# Patient Record
Sex: FEMALE | Race: WHITE | NOT HISPANIC OR LATINO | URBAN - METROPOLITAN AREA
[De-identification: names, ages, dates, MRNs, and addresses within clinical notes are randomized per-mention and may not be internally consistent; named-entity substitution may affect disease eponyms.]

---

## 2017-08-19 ASSESSMENT — MIFFLIN-ST. JEOR: SCORE: 1268.63

## 2017-08-20 ENCOUNTER — ANESTHESIA - HEALTHEAST (OUTPATIENT)
Dept: SURGERY | Facility: CLINIC | Age: 82
End: 2017-08-20

## 2017-08-20 ENCOUNTER — SURGERY - HEALTHEAST (OUTPATIENT)
Dept: SURGERY | Facility: CLINIC | Age: 82
End: 2017-08-20

## 2017-08-20 ASSESSMENT — MIFFLIN-ST. JEOR: SCORE: 1292.22

## 2017-08-21 ASSESSMENT — MIFFLIN-ST. JEOR: SCORE: 1318.07

## 2017-08-22 ASSESSMENT — MIFFLIN-ST. JEOR: SCORE: 1370.24

## 2017-08-23 ASSESSMENT — MIFFLIN-ST. JEOR: SCORE: 1404.26

## 2017-08-24 ASSESSMENT — MIFFLIN-ST. JEOR: SCORE: 1396.09

## 2017-08-29 ENCOUNTER — OFFICE VISIT - HEALTHEAST (OUTPATIENT)
Dept: GERIATRICS | Facility: CLINIC | Age: 82
End: 2017-08-29

## 2017-08-29 DIAGNOSIS — N39.0 URINARY TRACT INFECTION: ICD-10-CM

## 2017-08-29 DIAGNOSIS — K25.5: ICD-10-CM

## 2017-08-29 DIAGNOSIS — R55 SYNCOPE: ICD-10-CM

## 2017-08-29 DIAGNOSIS — R52 PAIN MANAGEMENT: ICD-10-CM

## 2017-08-29 DIAGNOSIS — A41.50 GRAM NEGATIVE SEPSIS (H): ICD-10-CM

## 2017-08-31 ENCOUNTER — OFFICE VISIT - HEALTHEAST (OUTPATIENT)
Dept: GERIATRICS | Facility: CLINIC | Age: 82
End: 2017-08-31

## 2017-08-31 DIAGNOSIS — N39.0 URINARY TRACT INFECTION: ICD-10-CM

## 2017-08-31 DIAGNOSIS — A41.50 GRAM NEGATIVE SEPSIS (H): ICD-10-CM

## 2017-08-31 DIAGNOSIS — R52 PAIN MANAGEMENT: ICD-10-CM

## 2017-08-31 DIAGNOSIS — K25.5: ICD-10-CM

## 2017-09-04 ENCOUNTER — OFFICE VISIT - HEALTHEAST (OUTPATIENT)
Dept: GERIATRICS | Facility: CLINIC | Age: 82
End: 2017-09-04

## 2017-09-04 DIAGNOSIS — Z79.01 LONG TERM CURRENT USE OF ANTICOAGULANT THERAPY: ICD-10-CM

## 2017-09-04 DIAGNOSIS — I10 ESSENTIAL HYPERTENSION: ICD-10-CM

## 2017-09-04 DIAGNOSIS — D64.9 ANEMIA: ICD-10-CM

## 2017-09-04 DIAGNOSIS — R19.8 PERFORATED VISCUS: ICD-10-CM

## 2017-09-04 DIAGNOSIS — R55 VASOVAGAL SYNCOPE: ICD-10-CM

## 2017-09-06 ENCOUNTER — OFFICE VISIT - HEALTHEAST (OUTPATIENT)
Dept: GERIATRICS | Facility: CLINIC | Age: 82
End: 2017-09-06

## 2017-09-06 DIAGNOSIS — R63.0 DECREASED APPETITE: ICD-10-CM

## 2017-09-06 DIAGNOSIS — Z98.890 H/O EXPLORATORY LAPAROTOMY: ICD-10-CM

## 2017-09-06 DIAGNOSIS — I10 HYPERTENSION: ICD-10-CM

## 2017-09-06 DIAGNOSIS — R53.1 GENERAL WEAKNESS: ICD-10-CM

## 2017-09-10 ENCOUNTER — OFFICE VISIT - HEALTHEAST (OUTPATIENT)
Dept: GERIATRICS | Facility: CLINIC | Age: 82
End: 2017-09-10

## 2017-09-10 DIAGNOSIS — T81.49XA SURGICAL WOUND INFECTION: ICD-10-CM

## 2017-09-11 ENCOUNTER — OFFICE VISIT - HEALTHEAST (OUTPATIENT)
Dept: GERIATRICS | Facility: CLINIC | Age: 82
End: 2017-09-11

## 2017-09-11 DIAGNOSIS — I10 ESSENTIAL HYPERTENSION: ICD-10-CM

## 2017-09-11 DIAGNOSIS — R63.0 DECREASED APPETITE: ICD-10-CM

## 2017-09-11 DIAGNOSIS — R19.8 PERFORATED VISCUS: ICD-10-CM

## 2017-09-11 DIAGNOSIS — Z98.890 H/O EXPLORATORY LAPAROTOMY: ICD-10-CM

## 2017-09-11 DIAGNOSIS — R53.1 GENERAL WEAKNESS: ICD-10-CM

## 2017-09-11 DIAGNOSIS — A41.50 GRAM NEGATIVE SEPSIS (H): ICD-10-CM

## 2017-09-14 ENCOUNTER — OFFICE VISIT - HEALTHEAST (OUTPATIENT)
Dept: GERIATRICS | Facility: CLINIC | Age: 82
End: 2017-09-14

## 2017-09-14 DIAGNOSIS — I10 ESSENTIAL HYPERTENSION: ICD-10-CM

## 2017-09-14 DIAGNOSIS — Z98.890 H/O EXPLORATORY LAPAROTOMY: ICD-10-CM

## 2017-09-14 DIAGNOSIS — R19.8 PERFORATED VISCUS: ICD-10-CM

## 2017-09-14 DIAGNOSIS — A41.50 GRAM NEGATIVE SEPSIS (H): ICD-10-CM

## 2017-09-15 ENCOUNTER — AMBULATORY - HEALTHEAST (OUTPATIENT)
Dept: GERIATRICS | Facility: CLINIC | Age: 82
End: 2017-09-15

## 2020-01-20 ENCOUNTER — RECORDS - HEALTHEAST (OUTPATIENT)
Dept: ADMINISTRATIVE | Facility: OTHER | Age: 85
End: 2020-01-20

## 2020-01-31 ENCOUNTER — RECORDS - HEALTHEAST (OUTPATIENT)
Dept: LAB | Facility: CLINIC | Age: 85
End: 2020-01-31

## 2020-01-31 LAB
ALBUMIN UR-MCNC: ABNORMAL MG/DL
AMORPH CRY #/AREA URNS HPF: ABNORMAL /[HPF]
APPEARANCE UR: ABNORMAL
BACTERIA #/AREA URNS HPF: ABNORMAL HPF
BILIRUB UR QL STRIP: NEGATIVE
CAOX CRY #/AREA URNS HPF: PRESENT /[HPF]
COLOR UR AUTO: ABNORMAL
GLUCOSE UR STRIP-MCNC: NEGATIVE MG/DL
HGB UR QL STRIP: ABNORMAL
KETONES UR STRIP-MCNC: NEGATIVE MG/DL
LEUKOCYTE ESTERASE UR QL STRIP: ABNORMAL
NITRATE UR QL: NEGATIVE
PH UR STRIP: 8 [PH] (ref 4.5–8)
RBC #/AREA URNS AUTO: ABNORMAL HPF
SP GR UR STRIP: 1.02 (ref 1–1.03)
SQUAMOUS #/AREA URNS AUTO: ABNORMAL LPF
TRI-PHOS CRY #/AREA URNS HPF: PRESENT /[HPF]
UROBILINOGEN UR STRIP-ACNC: ABNORMAL
WBC #/AREA URNS AUTO: ABNORMAL HPF
WBC CLUMPS #/AREA URNS HPF: PRESENT /[HPF]

## 2020-02-01 LAB — BACTERIA SPEC CULT: NORMAL

## 2020-03-01 ENCOUNTER — RECORDS - HEALTHEAST (OUTPATIENT)
Dept: LAB | Facility: CLINIC | Age: 85
End: 2020-03-01

## 2020-03-01 LAB
ALBUMIN UR-MCNC: ABNORMAL MG/DL
APPEARANCE UR: ABNORMAL
BACTERIA #/AREA URNS HPF: ABNORMAL HPF
BILIRUB UR QL STRIP: NEGATIVE
COLOR UR AUTO: YELLOW
GLUCOSE UR STRIP-MCNC: NEGATIVE MG/DL
HGB UR QL STRIP: NEGATIVE
KETONES UR STRIP-MCNC: NEGATIVE MG/DL
LEUKOCYTE ESTERASE UR QL STRIP: ABNORMAL
MUCOUS THREADS #/AREA URNS LPF: ABNORMAL LPF
NITRATE UR QL: NEGATIVE
PH UR STRIP: 7.5 [PH] (ref 4.5–8)
RBC #/AREA URNS AUTO: ABNORMAL HPF
SP GR UR STRIP: 1.01 (ref 1–1.03)
SQUAMOUS #/AREA URNS AUTO: ABNORMAL LPF
UROBILINOGEN UR STRIP-ACNC: ABNORMAL
WBC #/AREA URNS AUTO: >100 HPF
WBC CLUMPS #/AREA URNS HPF: PRESENT /[HPF]

## 2020-03-02 LAB — BACTERIA SPEC CULT: NORMAL

## 2021-05-31 ENCOUNTER — RECORDS - HEALTHEAST (OUTPATIENT)
Dept: ADMINISTRATIVE | Facility: CLINIC | Age: 86
End: 2021-05-31

## 2021-05-31 VITALS — WEIGHT: 184.7 LBS | BODY MASS INDEX: 28.93 KG/M2

## 2021-05-31 VITALS — HEIGHT: 67 IN | WEIGHT: 210.2 LBS | BODY MASS INDEX: 32.99 KG/M2

## 2021-06-02 ENCOUNTER — RECORDS - HEALTHEAST (OUTPATIENT)
Dept: ADMINISTRATIVE | Facility: CLINIC | Age: 86
End: 2021-06-02

## 2021-06-12 NOTE — PROGRESS NOTES
[facility]    Facility:   [facility] [[id number]]   Code Status: UNKNOWN      CHIEF COMPLAINT/REASON FOR VISIT:  Chief Complaint   Patient presents with     Review Of Multiple Medical Conditions     Perforated gastric ulcer, syncope with altered mental status, gram-negative sepsis currently on antibiotics systemic inflammatory response syndrome, acute renal failure superimposed on stage III chronic kidney disease, essential hypertension, persistent atrial fibrillation, possible wound infection.       HISTORY:      HPI: Maria Dolores is a 88 y.o. female who resides here at the [facility] undergoing physical and occupational therapy as well as medical management of multiple medical problems.  She was admitted to the hospital and she did have a perforated gastric ulcer and underwent exploratory laparotomy.  She did have severe abdominal pain and she had free air in the lower mediastinum around a large hiatal hernia.  She was treated with IV Levaquin and IV Zosyn as well as a proton pump inhibitor and she remains on antibiotics at this time.  Repeat blood cultures on 8/22/2017 were negative for greater than 72 hours she did progress as anticipated postoperatively.  He was recommended she have 3 weeks of IV antibiotics and she will follow-up with urology secondary to recurrent urinary tract infections.  She will likely need follow-up with EGD.    There is mention of the notes of possible surgical wound infection.  However the incision does not appear to be infected at this time.  Patient complains of nothing at this time however she does have a weight loss of about 10 pounds since she has been admitted here.  She does have a poor appetite and she does have some nausea when she eats too much.  She is drinking well at this time and has no other concerns.  She is quite happy about the weight loss and feels she wants to lose weight however this is not the environment  in which she can lose weight.  She does have persistent atrial fibrillation however she is in sinus rhythm by auscultation today.  She does have left leg pain and swelling and she has not had a bowel movement in 2 days.    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 No pertinent family history.  Social History     Social History     Marital status:      Spouse name: N/A     Number of children: N/A     Years of education: N/A     Social History Main Topics     Smoking status: Never Smoker     Smokeless tobacco: Never Used     Alcohol use No     Drug use: No     Sexual activity: Not Asked     Other Topics Concern     None     Social History Narrative         Review of Systems   Constitutional:        Patient has not had a bowel movement within 2 days she also has left leg pain with some swelling of the left leg and she is urinating without difficulty.  She denies any urgency frequency or burning.  She has no pain at this time and denies any fevers chills vomiting diarrhea change in vision hearing taste or smell weakness one side or the other chest pain or shortness of breath.  The remainder the review of systems is negative.       .  Vitals:    09/11/17 0820   BP: 158/77   Pulse: 78   Resp: 16   Temp: 98.3  F (36.8  C)   SpO2: 98%       Physical Exam   Constitutional: No distress.   HENT:   Head: Normocephalic and atraumatic.   Mouth/Throat: No oropharyngeal exudate.   Eyes: Right eye exhibits no discharge. Left eye exhibits no discharge. No scleral icterus.   Neck: Neck supple. No thyromegaly present.   Cardiovascular: Normal rate and regular rhythm.    Pulmonary/Chest: Effort normal and breath sounds normal. No respiratory distress. She has no wheezes. She has no rales.   Abdominal: Soft. Bowel sounds are normal.  She exhibits distension.   Incision has Steri-Strips in the top part of the incision without any signs of drainage or discharge.  Incision does not look infected at this time and the repeat the lower staples are intact.   Musculoskeletal: She exhibits edema.   Left leg edema with palpatory tenderness over the posterior aspect of the upper leg.   Lymphadenopathy:     She has no cervical adenopathy.   Neurological: She is alert. No cranial nerve deficit.   Skin: She is not diaphoretic.   Psychiatric: She has a normal mood and affect. Her behavior is normal.         LABS: Recent laboratory values are as follows on 8/31/2017 a basic metabolic profile came back with sodium of 140 and a potassium of 3.2.  There is no recheck on the potassium at this time and the creatinine was 1.08 and the GFR was 48.  On 9/7/2017 hemoglobin was 10.8, and potassium went to 3.7.  C. difficile was negative on 9/5/2017.  Patient's weights are down to 175 yesterday which is down from 184 which is a loss of 9 pounds.      ASSESSMENT:      ICD-10-CM    1. H/O exploratory laparotomy Z98.890    2. Perforated viscus R19.8    3. General weakness R53.1    4. Decreased appetite R63.0    5. Gram negative sepsis A41.50    6. Essential hypertension I10        PLAN: Plan at this time is address the weight loss at this time house supplement will be done 3 times daily and nutrition will follow.  Await will be done stat today and basic metabolic profile be done today secondary to weight loss and possible acute kidney injury.  Her weakness seems to be improving at this time and her incision seems to be healing well at this time with no signs of any infection redness or discharge.  We will do a venous Doppler ultrasound today of the ultrasound of the right lower extremity to rule out DVT.  This is secondary to palpatory findings of pain in the posterior aspect of her upper left leg with some swelling.  I will continue to monitor above medical problems and  patient will continue with physical and Occupational Therapy.  Greater than 38 minutes was spent on this follow-up visit with greater than 50% of the time dedicated to coordination of care.      Total  minutes of which % was spent counseling and coordination of care of the above plan.    Electronically signed by: Andrea Rush DO

## 2021-06-12 NOTE — ANESTHESIA PREPROCEDURE EVALUATION
Anesthesia Evaluation      Patient summary reviewed   No history of anesthetic complications     Airway   Mallampati: II  Neck ROM: limited   Pulmonary - normal exam                          Cardiovascular   Exercise tolerance: < 4 METS  (+) hypertension, dysrhythmias (A-fib with RVR), ,     Rhythm: regular  Rate: normal,         Neuro/Psych      Endo/Other       GI/Hepatic/Renal    (+) hiatal hernia, GERD,   chronic renal disease,      Other findings: Patient wishes to be FULL CODE          Dental    (+) upper dentures and edentulous                       Anesthesia Plan  Planned anesthetic: general endotracheal  RSI  Clarified with patient that she is to be FULL CODE  ASA 3   Induction: intravenous   Anesthetic plan and risks discussed with: patient  Anesthesia plan special considerations: rapid sequence induction, antiemetics, IV therapy two IVs,   Post-op plan: routine recovery  DNR/DNI status   DNR/DNI status discussed with patient.  Suspension of DNR: Patient wishes to be FULL CODE at all times.

## 2021-06-12 NOTE — ANESTHESIA POSTPROCEDURE EVALUATION
Patient: Maria Dolores YOUNG [place]  EXPLORATORY LAPAROTOMY WITH REPAIR/PATCH PERFORATED GASTRIC ULCER  Anesthesia type: general    Patient location: PACU  Last vitals:   Vitals:    08/20/17 1530   BP: 115/63   Pulse: 90   Resp: 24   Temp:    SpO2: 95%     Post vital signs: stable  Level of consciousness: awake and responds to simple questions  Post-anesthesia pain: pain controlled  Post-anesthesia nausea and vomiting: no  Pulmonary: unassisted, return to baseline  Cardiovascular: stable and blood pressure at baseline  Hydration: adequate  Anesthetic events: no    QCDR Measures:  ASA# 11 - Suellen-op Cardiac Arrest: ASA11B - Patient did NOT experience unanticipated cardiac arrest  ASA# 12 - Suellen-op Mortality Rate: ASA12B - Patient did NOT die  ASA# 13 - PACU Re-Intubation Rate: ASA13B - Patient did NOT require a new airway mgmt  ASA# 10 - Composite Anes Safety: ASA10A - No serious adverse event  ASA# 38 - New Corneal Injury: ASA38A - No new exposure keratitis or corneal abrasion in PACU    Additional Notes:

## 2021-06-12 NOTE — PROGRESS NOTES
[facility]    Facility:   [facility] [[id number]]   Code Status: POLST AVAILABLE      CHIEF COMPLAINT/REASON FOR VISIT:  Chief Complaint   Patient presents with     Review Of Multiple Medical Conditions     Gram (-) sepsis, UTI, perforated gastric ulcer       HISTORY:   HPI: Maria Dolores is a 88 y.o. female with PMH gram (-) sepsis, UTI, perforated gastric ulcer who is being seen today at [facility]CU for review of multiple medical conditions. Maria Dolores was admitted to the hospital admitted to [facility] after not feeling well for a few days. She had poor oral intake and urinary incontinence and also urinary retention with abdominal pain.  She then had an  unresponsive episode and found to have gram-negative bacteria which is Klebsiella pneumonia which was pansensitive.  She was treated aggressively with antibiotics and a CT scan was done secondary to her abdominal pain and showed free air in the lower mediastinum around a large hiatal hernia and also free air in the abdomen.  She was evaluated by surgery and underwent exploratory laparotomy with repair and patch of the perforated gastric ulcer. She was treated with IV Levaquin and IV Zosyn and was put on a proton pump inhibitor.  Repeat blood cultures on 8/22/2017 were negative.  At discharge It was recommended that she have 3 weeks of IV antibiotics and she will need follow-up with urology secondary to her recurrent urinary tract infections.  She will also see GI as an outpatient for possible EGD due to her gastric ulcer.    Maria Dolores' stay at TCU has been progressing well. She is participating in therapies without difficulties. Pain has been managed. Denies abdomnal pain at this time.  Continues on IV abx for sepsis. On last lab check noted to have K 2.8. Was started on KCL 20 meq daily and K today was 3.2. Has complaints of runny nose. Thinks it may be related to allergy. Is requesting medication for.  Also is concerned about edema to her BLEs. Legs are usually in a dependant position. Has no other concerns. Denies respiratory difficulties. Denies lightheadedness, dizziness, chest pain.     Past Medical History:   Diagnosis Date     Avascular necrosis of femoral head 1/14/2015     CKD (chronic kidney disease) stage 3, GFR 30-59 ml/min      Essential hypertension      Persistent atrial fibrillation              No family history on file.  Social History     Social History     Marital status:      Spouse name: N/A     Number of children: N/A     Years of education: N/A     Social History Main Topics     Smoking status: Never Smoker     Smokeless tobacco: Never Used     Alcohol use No     Drug use: No     Sexual activity: Not on file     Other Topics Concern     Not on file     Social History Narrative     Current Outpatient Prescriptions   Medication Sig Dispense Refill     fluticasone (FLONASE) 50 mcg/actuation nasal spray 1 spray into each nostril daily.       potassium chloride (KLOR-CON) 20 mEq packet Take 20 mEq by mouth daily.       acetaminophen (TYLENOL) 500 MG tablet Take 1 tablet (500 mg total) by mouth every 4 (four) hours as needed for pain (For mild to mod pain).  0     aspirin 81 MG EC tablet Take 81 mg by mouth daily.       bimatoprost (LUMIGAN) 0.01 % Drop Administer 1 drop to both eyes at bedtime.        dorzolamide (TRUSOPT) 2 % ophthalmic solution Administer 1 drop to both eyes 3 (three) times a day.       lisinopril (PRINIVIL,ZESTRIL) 10 MG tablet Take 10 mg by mouth daily.       metoprolol succinate (TOPROL-XL) 25 MG Take 12.5 mg by mouth 2 (two) times a day.       miscellaneous medical supply (BLOOD PRESSURE CUFF) Misc Use 1 kit As Directed every morning. 1 each 0     omeprazole (PRILOSEC) 20 MG capsule Take 1 capsule (20 mg total) by mouth daily before breakfast. 60 capsule 0     oxyCODONE (ROXICODONE) 5 MG immediate release tablet Take 0.5 tablets (2.5 mg total) by mouth every 4 (four)  hours as needed for pain (or mod to severe pain). 15 tablet 0     piperacillin-tazobactam 3.375 g in NaCl 0.9 % 0.9 % 100 mL IVPB Infuse 3.375 g into a venous catheter every 8 (eight) hours for 20 days. 1 each 0     No current facility-administered medications for this visit.      Allergies   Allergen Reactions     Ibuprofen Unknown       REVIEW OF SYSTEM:  Constitutional: negative for chills, fatigue and fevers  Respiratory: negative for cough, dyspnea on exertion and sputum  Cardiovascular: negative for chest pain, fatigue and syncope  Gastrointestinal: negative  Genitourinary:negative for dysuria  Integument/breast: Healing abdominal incision covered with dressing  Musculoskeletal:Positive for edema  Behavioral/Psych: negative  Neurologic: Intermittant burning pain to left arm (previous hx of shingles)    PHYSICAL EXAM:     General Appearance:    Alert, cooperative, no distress, appears stated age   Head:    Normocephalic, without obvious abnormality, atraumatic   Eyes:    Conjunctiva/corneas clear both eyes   Ears:    Normal TM's and external ear canals, both ears   Nose:   Nares normal, septum midline   Throat:   Lips, mucosa, and tongue normal; teeth and gums normal   Neck:   Supple, symmetrical, trachea midline, no adenopathy   Lungs:     Clear to auscultation bilaterally, respirations unlabored    Heart:    Irregular rhythm with controlled rate   Abdomen:     Soft, non-tender, bowel sounds active all four quadrants,     no masses, no organomegaly;   Extremities:   Extremities normal, atraumatic, Edema to BLE (L>R)   Pulses:   2+ and symmetric all extremities   Skin:  Incision to abdomen covered with dressing, no signs of infection noteds   Neurologic:   Normal strength, sensation and reflexes     throughout           LABS:   Component      Latest Ref Rng & Units 8/30/2017 8/31/2017   Sodium      136 - 145 mmol/L 139 140   Potassium      3.5 - 5.0 mmol/L 2.7 (LL) 3.2 (L)   Chloride      98 - 107 mmol/L 107  109 (H)   CO2      22 - 31 mmol/L 24 24   Anion Gap, Calculation      5 - 18 mmol/L 8 7   Glucose      70 - 125 mg/dL 148 (H) 118   Calcium      8.5 - 10.5 mg/dL 8.4 (L) 8.8   BUN      8 - 28 mg/dL 11 8   Creatinine      0.60 - 1.10 mg/dL 1.17 (H) 1.08   GFR MDRD Af Amer      >60 mL/min/1.73m2 53 (L) 58 (L)   GFR MDRD Non Af Amer      >60 mL/min/1.73m2 44 (L) 48 (L)   Hemoglobin      12.0 - 16.0 g/dL 10.6 (L)        ASSESSMENT:    ICD-10-CM    1. Gram negative sepsis A41.50    2. Urinary tract infection N39.0    3. Perforated gastric ulcer K25.5    4. Pain management R52        PLAN:    Due to low K levels will continue KCL 20 meq daily. Continue IV abx for sepsis. Follow-up with urology as outpatient for frequent UTIs and with GI for EGD for perforated ulcer . For complaints of allergic rhinitis will order Flonase 1 spray each nostril daily.  For BLE edema encourage elevation of bilateral lower extremities when up. Will also order ACE wraps - on in AM and off at HS. Incision healing. No other changes to medications or treatments at this time as is otherwise stable.    IFederica am scribing for and in the presence of Andrea Rush MD.   I Andrea Rush  personally performed the services described in this documentation, as scribed by  Federica Boyle in my presence, and it is both accurate and complete.     Electronically signed by: Andrea Rush DO

## 2021-06-12 NOTE — PROGRESS NOTES
[facility]    Facility:   [facility] [[id number]]   Code Status: UNKNOWN      CHIEF COMPLAINT/REASON FOR VISIT:  Chief Complaint   Patient presents with     Discharge Summary     Perforated gastric ulcer, syncope with altered mental status, gram-negative sepsis was currently on antibiotics, systemic inflammatory response syndrome which has resolved, acute renal failure superimposed on stage III chronic kidney disease, essential hypertension, persistent atrial fibrillation.       HISTORY:      HPI: Maria Dolores is a 88 y.o. female was been residing here at the [facility] undergoing physical and occupational therapy secondary to multiple medical problems.  She was recently admitted to the hospital with severe abdominal pain she did have free air in the lower mediastinal area around the large hiatal hernia.  She was also found to have gram-negative sepsis was started on IV Levaquin and IV Zosyn.  She was also put on a protein pump inhibitor and repeat blood cultures on 8/22/2017 in the hospital were negative for greater than 72 hours.  She was kept on IV Zosyn and she did have exploratory laparotomy in the hospital.  She was on 3 weeks of IV antibiotics and she did finish yesterday.  She does have recurrent urinary tract infections and she is expected to follow-up with urology.  And she will likely need follow-up with EGD.    During her hospital stay she progressed as anticipated with physical and Occupational Therapy.  She has slight worsening of her GFR as well as her creatinine however the rest of her basic metabolic profile stayed within normal limits.  She did have a possible wound infection but culture did reveal normal skin henrique and incision looked intact.  Staples were removed here in the TCU and Steri-Strips were placed.  She did have some nausea when she eats too much however she is drinking well at this time.  She does like the weight loss however  we did encourage her that this is not the time to have a weight loss and there was a weight loss here in the TCU.  She does have persistent atrial fibrillation however she has been in sinus rhythm for most of her time here.  She also had left leg and swelling in the right leg but that was negative for DVT and she is progressed as anticipated with physical and Occupational Therapy.    Patient claims she feels well today the antibiotics were stopped yesterday and she had her PICC line removed without any difficulty.  Her abdominal incision is Steri-Stripped at this time and no signs of any infection and she still has poor appetite but she is forcing herself to eat.  She has had a weight loss here and I did reiterate that she needs to eat and drink given that she does have acute kidney injury on chronic kidney disease.  Her vitals have remained stable.    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 No pertinent family history.  Social History     Social History     Marital status:      Spouse name: N/A     Number of children: N/A     Years of education: N/A     Social History Main Topics     Smoking status: Never Smoker     Smokeless tobacco: Never Used     Alcohol use No     Drug use: No     Sexual activity: Not Asked     Other Topics Concern     None     Social History Narrative         Review of Systems   Constitutional:        Patient denies any pain fevers chills nausea vomiting diarrhea change in vision hearing taste or smell weakness one side or the other chest pain shortness of breath she denies any incontinence urine or stool polyphagia or polydipsia polyuria urgency frequency or burning with urination and the remainder the review of systems is negative.    Patient also does have a weight loss and needs to be  monitored and she needs to follow-up with urology for urinalysis as well as follow-up with her primary care physician for weight and basic metabolic profile.       .  Vitals:    09/14/17 0747   BP: 138/76   Pulse: 74   Resp: 20   Temp: 97.3  F (36.3  C)   SpO2: 96%       Physical Exam   Constitutional: She is oriented to person, place, and time. No distress.   HENT:   Head: Normocephalic and atraumatic.   Nose: Nose normal.   Mouth/Throat: No oropharyngeal exudate.   Eyes: Right eye exhibits no discharge. Left eye exhibits no discharge. No scleral icterus.   Neck: No thyromegaly present.   Cardiovascular: Normal rate and regular rhythm.    Pulmonary/Chest: Effort normal and breath sounds normal. No respiratory distress. She has no wheezes. She has no rales.   Abdominal: Soft. Bowel sounds are normal. She exhibits distension. There is no tenderness.   Musculoskeletal:   Patient has trace edema bilateral.   Lymphadenopathy:     She has no cervical adenopathy.   Neurological: She is alert and oriented to person, place, and time. No cranial nerve deficit. She exhibits normal muscle tone.   Skin: Skin is warm and dry. She is not diaphoretic.   Psychiatric: She has a normal mood and affect. Her behavior is normal.         LABS: Laboratory values done here in the transitional care include the following on 9/7/2017 hemoglobin was stable at 10.8.  On 9/11/2017 sodium was 141, potassium was 4.1, calcium was 9.7, creatinine was 1.34, GFR was 37.      ASSESSMENT:      ICD-10-CM    1. H/O exploratory laparotomy Z98.890    2. Perforated viscus R19.8    3. Essential hypertension I10    4. Gram negative sepsis A41.50        PLAN: Plan at this time is okay to discharge home with current medications medication reconciliation was done at this time and appears to be accurate at this time with what is on her current med regime at this time.  Antibiotics were stopped yesterday in the IV was pulled.  She will need to follow-up with her  primary care physician within 1 week for a basic metabolic profile as well as follow-up with urology for urinalysis.  Staples have been removed and Steri-Strips will fall off eventually.  No other changes to care plan at this time and greater than 30 minutes was spent on this discharge with greater than 50% of the time dedicated to coordination of care including discussing case with staff and informing patient of her follow-up plans.  Also med reconciliation was done.      Total  minutes of which % was spent counseling and coordination of care of the above plan.    Electronically signed by: Andrea Rush DO

## 2021-06-12 NOTE — PROGRESS NOTES
[facility]    This encounter occurred on September 8    Facility:   [facility] [[id number]]   Code Status: FULL CODE    88-year-old woman underwent repair of a perforated gastric ulcer.Hospitalized 29 August.  She has had an uneventful recovery.  Asked by the nurses to evaluate her wound.  Patient is comfortable with no complaints.  Vital signs been stable.  Appetite is improving.  She demonstrates the proximal 11 staples showing bright erythema and slight induration around the insertion sites.  The distal staples are clean and dry.    Assessment- emerging cellulitis around the proximal staples of the surgical wound.  Plan- remove top 12 staples and fixed with Steri-Strips.    Electronically signed by: Barney Nettles MD

## 2021-06-12 NOTE — ANESTHESIA CARE TRANSFER NOTE
Last vitals:   Vitals:    08/20/17 1441   BP: 119/58   Pulse: 86   Resp: 14   Temp: 36.3  C (97.3  F)   SpO2: 98%     Patient's level of consciousness is drowsy  Spontaneous respirations: yes  Maintains airway independently: yes  Dentition unchanged: yes  Oropharynx: oropharynx clear of all foreign objects    QCDR Measures:  ASA# 20 - Surgical Safety Checklist: WHO surgical safety checklist completed prior to induction  PQRS# 430 - Adult PONV Prevention: 4558F - Pt received => 2 anti-emetic agents (different classes) preop & intraop  ASA# 8 - Peds PONV Prevention: NA - Not pediatric patient, not GA or 2 or more risk factors NOT present  PQRS# 424 - Suellen-op Temp Management: 4559F - At least one body temp DOCUMENTED => 35.5C or 95.9F within required timeframe  PQRS# 426 - PACU Transfer Protocol: - Transfer of care checklist used  ASA# 14 - Acute Post-op Pain: ASA14B - Patient did NOT experience pain >= 7 out of 10

## 2021-06-12 NOTE — PROGRESS NOTES
[facility]    Facility:   [facility] [[id number]]   Code Status: FULL CODE      CHIEF COMPLAINT/REASON FOR VISIT:  Chief Complaint   Patient presents with     Review Of Multiple Medical Conditions     perforated gastric ulcer, syncope, stage 3 chronic kidney disease, HTN       HISTORY:      HPI: Maria Dolores is a 88 y.o. female  who was recently admitted to the hospital on 8/19/2017.  She was admitted to [facility] after not feeling well for a few days she had poor oral intake and urinary incontinence and also urinary retention with abdominal pain. She then was unresponsive and was brought into the hospital was found to have gram-negative bacteria which is Klebsiella pneumonia which was pansensitive.  She was treated aggressively with antibiotics and a CT scan was done secondary to her abdominal pain and showed free air in the lower mediastinum around a large hiatal hernia and also free air in the abdomen.  She was evaluated by surgery and underwent exploratory laparotomy with repair and patch of the perforated gastric ulcer.  She was treated with IV Levaquin and IV Zosyn and was put on a proton pump inhibitor.    Repeat blood cultures on 8/22/2017 were negative greater than 72 hours and she did progress postoperatively.  It was recommended that she have 3 weeks of IV antibiotics and she will need follow-up with urology secondary to her recurrent urinary tract infections.  She will see GI as an outpatient for possible EGD due to her gastric  ulcer.  She was treated appropriately and transferred here to the TCU at [place] in stable condition where she is undergoing physical and occupational therapy.        Past Medical History:   Diagnosis Date     Avascular necrosis of femoral head 1/14/2015     CKD (chronic kidney disease) stage 3, GFR 30-59 ml/min      Essential hypertension      Persistent atrial fibrillation              No family history on file.  Social  History     Social History     Marital status:      Spouse name: N/A     Number of children: N/A     Years of education: N/A     Social History Main Topics     Smoking status: Never Smoker     Smokeless tobacco: Never Used     Alcohol use No     Drug use: No     Sexual activity: Not on file     Other Topics Concern     Not on file     Social History Narrative         Review of Systems   Constitutional: Positive for appetite change. Negative for chills, fatigue and fever.   HENT: Negative for congestion and sore throat.    Respiratory: Negative for cough, shortness of breath and wheezing.    Cardiovascular: Positive for leg swelling. Negative for chest pain.   Gastrointestinal: Positive for diarrhea. Negative for abdominal distention, abdominal pain, constipation and nausea.        Reports daily in the AM   Genitourinary: Negative for dysuria.   Musculoskeletal: Negative for arthralgias and myalgias.   Skin: Negative for color change, rash and wound.   Neurological: Positive for weakness. Negative for dizziness.   Psychiatric/Behavioral: Negative for sleep disturbance.       .  Vitals:    09/04/17 0907   BP: (!) 175/93   Pulse: 75   Resp: 12   Temp: 98.3  F (36.8  C)   SpO2: 97%   Weight: 184 lb 11.2 oz (83.8 kg)       Physical Exam   Constitutional: She is oriented to person, place, and time. She appears well-developed and well-nourished.   HENT:   Head: Normocephalic.   Eyes: Conjunctivae are normal.   Neck: Normal range of motion.   Cardiovascular: Normal rate.    No murmur heard.  Irregularly irregular with rate controlled.   Pulmonary/Chest: Breath sounds normal. No respiratory distress. She has no wheezes. She has no rales.   Abdominal: Soft. Bowel sounds are normal. She exhibits no distension. There is tenderness.   Midline surgical incision with staples.   Musculoskeletal: Normal range of motion. She exhibits edema.   PICC line right upper extremity  Edema 3+ lower exttremities   Neurological: She is  alert and oriented to person, place, and time.   Skin: Skin is warm.   Staples midline abdomen-C/D/I   Psychiatric: She has a normal mood and affect. Her behavior is normal.         LABS:   8/31/17  K-3.2 up from 2.7 on replacement   GFR 48  HGB 10.6      ASSESSMENT:      ICD-10-CM    1. Essential hypertension I10    2. Anemia D64.9    3. Long term current use of anticoagulant therapy Z79.01    4. Perforated viscus R19.8    5. Vasovagal syncope R55        PLAN:    Exploratory lap/gastritis- Monitor staples for S/S infection and continue zosyn IV q 8 hours until 9/14/17, Follow up with GI outpatient  Anticoagulant therapy on ASA  UTI-Treated with IV Levaquin- will follow up with Urology outpatient for recurrent UTI's  Pneumonia- treated with IV Levaquin  Diarrhea- check c-diff  Anemia- Last HGB 10.6. Will recheck labs  Hypokalemia- On replacement with follow up of labs    Electronically signed by: Kathryn Jarvis CNP

## 2021-06-12 NOTE — PROGRESS NOTES
[facility]    Facility:   [facility] [[id number]]   Code Status: FULL CODE      CHIEF COMPLAINT/REASON FOR VISIT:  Chief Complaint   Patient presents with     Follow Up     explor lap       HISTORY:      HPI: Maria Dolores is a 88 y.o. female who I had the pleasure to visit with today secondary to her hospitalization August 19 - August 25, 2017 secondary to syncope and weakness eventually undergoing a exploratory laparotomy secondary to a large hiatal hernia repair patch perforated gastric ulcer.  She does have staples present.  There are clean and dry.  She still has a PICC line in the right bicep secondary to IV antibiotics.  She has had some loose stool the C. difficile was negative.  She does have patterns of loose as well as soft stool but otherwise is not symptomatic regarding C. difficile.  Her Accu-Cheks running 130-170 and she is not on any diabetic medications so we will discontinue the Accu-Cheks.  She has been normotensive and afebrile.  Not much of an appetite decreased taste.  Rarely having any pain does take oxycodone as needed but scattered maybe 1 a day and sometimes none.  Her last therapy day is actually today supposed to [place] believe till next week because of the IV antibiotics.  She does have hypokalemia did have potassium August 31 was 3.2 is on 20 mEq will bring her up to an extra 10 mEq.  No heartburn or reflux.  Does have generalized weakness.  Also had a chance to talk about her living and [place] and she does have a wonderful card club and her favorite is bridge.    Past Medical History:   Diagnosis Date     Avascular necrosis of femoral head 1/14/2015     CKD (chronic kidney disease) stage 3, GFR 30-59 ml/min      Essential hypertension      Persistent atrial fibrillation              No family history on file.  Social History     Social History     Marital status:      Spouse name: N/A     Number of children: N/A     Years of education: N/A      Social History Main Topics     Smoking status: Never Smoker     Smokeless tobacco: Never Used     Alcohol use No     Drug use: No     Sexual activity: Not on file     Other Topics Concern     Not on file     Social History Narrative         Review of Systems  Currently denies chills fever coughing wheezing chest pain dizziness or vertigo nausea vomiting diarrhea dysuria unusual myalgias arthralgias stiff neck swollen glands or rashes.  History of hypertension CKD stage III GERD.      Current Outpatient Prescriptions:      acetaminophen (TYLENOL) 500 MG tablet, Take 1 tablet (500 mg total) by mouth every 4 (four) hours as needed for pain (For mild to mod pain)., Disp: , Rfl: 0     aspirin 81 MG EC tablet, Take 81 mg by mouth daily., Disp: , Rfl:      bimatoprost (LUMIGAN) 0.01 % Drop, Administer 1 drop to both eyes at bedtime. , Disp: , Rfl:      dorzolamide (TRUSOPT) 2 % ophthalmic solution, Administer 1 drop to both eyes 3 (three) times a day., Disp: , Rfl:      fluticasone (FLONASE) 50 mcg/actuation nasal spray, 1 spray into each nostril daily., Disp: , Rfl:      lisinopril (PRINIVIL,ZESTRIL) 10 MG tablet, Take 10 mg by mouth daily., Disp: , Rfl:      metoprolol succinate (TOPROL-XL) 25 MG, Take 12.5 mg by mouth 2 (two) times a day., Disp: , Rfl:      miscellaneous medical supply (BLOOD PRESSURE CUFF) Misc, Use 1 kit As Directed every morning., Disp: 1 each, Rfl: 0     omeprazole (PRILOSEC) 20 MG capsule, Take 1 capsule (20 mg total) by mouth daily before breakfast., Disp: 60 capsule, Rfl: 0     oxyCODONE (ROXICODONE) 5 MG immediate release tablet, Take 0.5 tablets (2.5 mg total) by mouth every 4 (four) hours as needed for pain (or mod to severe pain)., Disp: 15 tablet, Rfl: 0     piperacillin-tazobactam 3.375 g in NaCl 0.9 % 0.9 % 100 mL IVPB, Infuse 3.375 g into a venous catheter every 8 (eight) hours for 20 days., Disp: 1 each, Rfl: 0     potassium chloride (KLOR-CON) 20 mEq packet, Take 20 mEq by mouth  daily., Disp: , Rfl:     .There were no vitals filed for this visit.  Blood pressure 131/79 pulse 78 respirations 20 temperature 98.3 her weights on September 5 181 pounds  Physical Exam   Constitutional: She is oriented to person, place, and time. No distress.   HENT:   Head: Normocephalic.   Eyes: Pupils are equal, round, and reactive to light.   Neck: Neck supple. No thyromegaly present.   Cardiovascular: Normal rate, regular rhythm and normal heart sounds.    Pulmonary/Chest: Breath sounds normal.   Abdominal: Bowel sounds are normal. There is no tenderness. There is no guarding.   [place] present.  Clean dry and intact.  Positive bowel sounds.  Nontender.   Musculoskeletal:   PICC line right bicep.  Generalized weakness.   Lymphadenopathy:     She has no cervical adenopathy.   Neurological: She is oriented to person, place, and time.   Skin: Skin is warm and dry. No rash noted.         LABS:   Lab Results   Component Value Date    WBC 9.5 08/22/2017    HGB 10.6 (L) 08/30/2017    HCT 35.3 08/22/2017    MCV 94 08/22/2017     08/24/2017     Results for orders placed or performed in visit on 08/31/17   Basic Metabolic Panel   Result Value Ref Range    Sodium 140 136 - 145 mmol/L    Potassium 3.2 (L) 3.5 - 5.0 mmol/L    Chloride 109 (H) 98 - 107 mmol/L    CO2 24 22 - 31 mmol/L    Anion Gap, Calculation 7 5 - 18 mmol/L    Glucose 118 70 - 125 mg/dL    Calcium 8.8 8.5 - 10.5 mg/dL    BUN 8 8 - 28 mg/dL    Creatinine 1.08 0.60 - 1.10 mg/dL    GFR MDRD Af Amer 58 (L) >60 mL/min/1.73m2    GFR MDRD Non Af Amer 48 (L) >60 mL/min/1.73m2       Lab Results   Component Value Date    ALT 12 08/19/2017    AST 16 08/19/2017    ALKPHOS 72 08/19/2017    BILITOT 1.1 (H) 08/19/2017         ASSESSMENT:      ICD-10-CM    1. H/O exploratory laparotomy Z98.890    2. Decreased appetite R63.0    3. Hypertension I10    4. General weakness R53.1        PLAN:    Her last day of therapy apparently is today she will also stick around  still for the IV antibiotics every 8 hours.  We will discontinue the Accu-Cheks at another 10 mEq of potassium due to the potassium level August 31 at 3.2 would recheck again in another week otherwise her bowels have improved she will also try to continue having protein supplements due to decreased appetite.       Electronically signed by: Michael Duane Johnson, CNP

## 2021-06-12 NOTE — PROGRESS NOTES
[facility]      Facility:    [facility] [[id number]]  Code Status: UNKNOWN      Chief Complaint/Reason for Visit:  Chief Complaint   Patient presents with     H & P     Perforated gastric ulcer, syncope with altered mental status, gram-negative sepsis, systemic inflammatory response syndrome, acute renal failure superimposed on stage III chronic kidney disease, essential hypertension, persistent atrial fibrillation.       HPI:   Maria Dolores is a 88 y.o. female who was recently admitted to the hospital on 8/19/2017 she was admitted to [facility] after not feeling well for a few days she had poor oral intake and urinary incontinence and also urinary retention with abdominal pain.  She does she then was unresponsive and was brought into the hospital was found to have gram-negative bacteria which is Klebsiella pneumonia which was pansensitive.  She was treated aggressively with antibiotics and a CT scan was done secondary to her abdominal pain and showed free air in the lower mediastinum around a large hiatal hernia and also free air in the abdomen.  She was evaluated by surgery and underwent exploratory laparotomy with repair and patch of the perforated gastric ulcer.  She was treated with IV Levaquin and IV Zosyn and was put on a proton pump inhibitor.  She came here to the TCU and that was not in the admission orders however it was in the discharge summary and I am starting that today.  Repeat blood cultures on 8/22/2017 were negative greater than 72 hours and she did progress postoperatively.  It was recommended that she have 3 weeks of IV antibiotics and she will need follow-up with urology secondary to her recurrent urinary tract infections.  She will see GI as an outpatient for possible EGD due to her gastric ulcer.  All in all she was treated appropriately and transferred here to the TCU at [place] in stable condition.    Patient claims today she is  somewhat depressed given the circumstances she is under at this time.  She is somewhat frustrated that this happened to her however she has no pain issues at this time.  She still has some urgency and she does have some loose stools in the morning.  Her appetite is still poor but starting to  at this time and she has no other concerns at this time.  Her physical therapist is working with her today indicate that she is doing okay at this time.    Past Medical History:  Past Medical History:   Diagnosis Date     Avascular necrosis of femoral head 1/14/2015     CKD (chronic kidney disease) stage 3, GFR 30-59 ml/min      Essential hypertension      Persistent atrial fibrillation            Surgical History:  Past Surgical History:   Procedure Laterality Date     EXPLORATORY LAPAROTOMY N/A 8/20/2017    Procedure: EXPLORATORY LAPAROTOMY WITH REPAIR/PATCH PERFORATED GASTRIC ULCER;  Surgeon: Taye Mejia MD;  [facility];  Service:      OTHER SURGICAL HISTORY      L hip sx - 2008, cervical fusion 1985     PICC  8/24/2017            Family History:   History reviewed. No pertinent family history.    Social History:    Social History     Social History     Marital status:      Spouse name: N/A     Number of children: N/A     Years of education: N/A     Social History Main Topics     Smoking status: Never Smoker     Smokeless tobacco: Never Used     Alcohol use No     Drug use: No     Sexual activity: Not Asked     Other Topics Concern     None     Social History Narrative          Review of Systems   Constitutional:        Patient claims she does have some loose stools in the morning and she claims her pain is well managed she denies any fevers chills nausea vomiting chest pain shortness of breath she denies any incontinence urine or stool but still has some slight urinary tract symptoms which is some urgency with urination.  She is progressing as anticipated with therapy according to  the therapy staff and the remainder the review of systems is negative and no pain.       Vitals:    08/29/17 0823   BP: 151/81   Pulse: 73   Resp: 24   Temp: 98.4  F (36.9  C)   SpO2: 96%       Physical Exam   Constitutional: No distress.   HENT:   Head: Normocephalic and atraumatic.   Nose: Nose normal.   Cardiovascular:   Patient's heart sounds are irregularly irregular with adequate rate control.   Pulmonary/Chest: Effort normal and breath sounds normal. No respiratory distress. She has no wheezes. She has no rales.   Abdominal: Soft. Bowel sounds are normal. She exhibits distension. There is no tenderness.   Bowel sounds are very active at this time and only light palpation was done around the abdomen.   Musculoskeletal: She exhibits no edema.   Neurological: She is alert. No cranial nerve deficit. She exhibits normal muscle tone.   Skin: She is not diaphoretic.   Psychiatric:   Patient's affect was sad.       Medication List:  Current Outpatient Prescriptions   Medication Sig     acetaminophen (TYLENOL) 500 MG tablet Take 1 tablet (500 mg total) by mouth every 4 (four) hours as needed for pain (For mild to mod pain).     aspirin 81 MG EC tablet Take 81 mg by mouth daily.     bimatoprost (LUMIGAN) 0.01 % Drop Administer 1 drop to both eyes at bedtime.      dorzolamide (TRUSOPT) 2 % ophthalmic solution Administer 1 drop to both eyes 3 (three) times a day.     lisinopril (PRINIVIL,ZESTRIL) 10 MG tablet Take 10 mg by mouth daily.     metoprolol succinate (TOPROL-XL) 25 MG Take 12.5 mg by mouth 2 (two) times a day.     miscellaneous medical supply (BLOOD PRESSURE CUFF) Misc Use 1 kit As Directed every morning.     omeprazole (PRILOSEC) 20 MG capsule Take 1 capsule (20 mg total) by mouth daily before breakfast.     oxyCODONE (ROXICODONE) 5 MG immediate release tablet Take 0.5 tablets (2.5 mg total) by mouth every 4 (four) hours as needed for pain (or mod to severe pain).     piperacillin-tazobactam 3.375 g in NaCl  0.9 % 0.9 % 100 mL IVPB Infuse 3.375 g into a venous catheter every 8 (eight) hours for 20 days.       Labs: Hospital labs are as follows; creatinine in the hospital was 1.19, GFR was 43, BUN was 18.  Sodium, potassium, calcium were within normal limits and platelets were 212.  Blood cultures were positive for gram-negative bacteria however I do not have a hemoglobin available at this time.      Assessment:    ICD-10-CM    1. Perforated gastric ulcer K25.5    2. Syncope R55    3. Pain management R52    4. Urinary tract infection N39.0    5. Gram negative sepsis A41.50        Plan: Plan at this time will get a PHQ 9 this week secondary to a depressed mood and this is mostly situational.  I would try to avoid antidepressant at this time but we will continue to monitor.  I will get a basic metabolic profile tomorrow and hemoglobin tomorrow secondary to acute kidney injury and anemia and I will start the omeprazole 20 mg 1 p.o. daily.  This will be done secondary to patient was ordered that on the discharge summary but it did not come over on the orders.  I will continue to monitor above medical problems and no other changes to care plan at this time.        Electronically signed by: Andrea Rush DO